# Patient Record
Sex: FEMALE | Race: WHITE
[De-identification: names, ages, dates, MRNs, and addresses within clinical notes are randomized per-mention and may not be internally consistent; named-entity substitution may affect disease eponyms.]

---

## 2018-10-01 ENCOUNTER — HOSPITAL ENCOUNTER (OUTPATIENT)
Dept: HOSPITAL 76 - LAB.R | Age: 28
End: 2018-10-01
Attending: ADVANCED PRACTICE MIDWIFE
Payer: COMMERCIAL

## 2018-10-01 DIAGNOSIS — Z36.9: Primary | ICD-10-CM

## 2018-10-01 PROCEDURE — 80306 DRUG TEST PRSMV INSTRMNT: CPT

## 2018-10-02 LAB
AMPHET UR QL SCN: NEGATIVE
BENZODIAZ UR QL SCN: NEGATIVE
COCAINE UR-SCNC: NEGATIVE UMOL/L
METHADONE UR QL SCN: NEGATIVE
METHAMPHET UR QL SCN: NEGATIVE
OPIATES UR QL SCN: NEGATIVE
VOLATILE DRUGS POS SERPL SCN: (no result)

## 2018-10-10 ENCOUNTER — HOSPITAL ENCOUNTER (OUTPATIENT)
Dept: HOSPITAL 76 - LAB | Age: 28
Discharge: HOME | End: 2018-10-10
Attending: ADVANCED PRACTICE MIDWIFE
Payer: COMMERCIAL

## 2018-10-10 DIAGNOSIS — Z36.9: Primary | ICD-10-CM

## 2018-10-10 LAB
ERYTHROCYTE [DISTWIDTH] IN BLOOD BY AUTOMATED COUNT: 13.1 % (ref 12–15)
HGB UR QL STRIP: 12 G/DL (ref 12–16)
MCH RBC QN AUTO: 30.2 PG (ref 27–31)
MCHC RBC AUTO-ENTMCNC: 34.7 G/DL (ref 32–36)
MCV RBC AUTO: 87 FL (ref 81–99)
NEUTROPHILS # SNV AUTO: 12.4 X10^3/UL (ref 4.8–10.8)
PDW BLD AUTO: 8.2 FL (ref 7.9–10.8)
PLATELET # BLD: 214 10^3/UL (ref 130–450)
RBC MAR: 3.99 10^6/UL (ref 4.2–5.4)

## 2018-10-10 PROCEDURE — 82950 GLUCOSE TEST: CPT

## 2018-10-10 PROCEDURE — 86850 RBC ANTIBODY SCREEN: CPT

## 2018-10-10 PROCEDURE — 36415 COLL VENOUS BLD VENIPUNCTURE: CPT

## 2018-10-10 PROCEDURE — 85027 COMPLETE CBC AUTOMATED: CPT

## 2018-10-18 ENCOUNTER — HOSPITAL ENCOUNTER (OUTPATIENT)
Dept: HOSPITAL 76 - LAB | Age: 28
Discharge: HOME | End: 2018-10-18
Attending: ADVANCED PRACTICE MIDWIFE
Payer: COMMERCIAL

## 2018-10-18 DIAGNOSIS — L29.9: ICD-10-CM

## 2018-10-18 DIAGNOSIS — R73.02: Primary | ICD-10-CM

## 2018-10-18 LAB
ALBUMIN DIAFP-MCNC: 3.3 G/DL (ref 3.2–5.5)
ALP SERPL-CCNC: 76 IU/L (ref 42–121)
ALT SERPL W P-5'-P-CCNC: 18 IU/L (ref 10–60)
AST SERPL W P-5'-P-CCNC: 22 IU/L (ref 10–42)
BILIRUB BLD-MCNC: 0.6 MG/DL (ref 0.2–1)
BILIRUB DIRECT SERPL-MCNC: < 0.1 MG/DL (ref 0.1–0.5)
GLOBULIN SER-MCNC: 3.1 G/DL (ref 2.1–4.2)
PROT SPEC-MCNC: 6.4 G/DL (ref 6.7–8.2)

## 2018-10-18 PROCEDURE — 80076 HEPATIC FUNCTION PANEL: CPT

## 2018-10-18 PROCEDURE — 82952 GTT-ADDED SAMPLES: CPT

## 2018-10-18 PROCEDURE — 82951 GLUCOSE TOLERANCE TEST (GTT): CPT

## 2018-10-18 PROCEDURE — 82239 BILE ACIDS TOTAL: CPT

## 2018-10-18 PROCEDURE — 36415 COLL VENOUS BLD VENIPUNCTURE: CPT

## 2018-10-22 ENCOUNTER — HOSPITAL ENCOUNTER (OUTPATIENT)
Dept: HOSPITAL 76 - WFO | Age: 28
Discharge: HOME | End: 2018-10-22
Attending: ADVANCED PRACTICE MIDWIFE
Payer: COMMERCIAL

## 2018-10-22 VITALS — DIASTOLIC BLOOD PRESSURE: 73 MMHG | SYSTOLIC BLOOD PRESSURE: 114 MMHG

## 2018-10-22 DIAGNOSIS — K83.1: ICD-10-CM

## 2018-10-22 DIAGNOSIS — O26.613: Primary | ICD-10-CM

## 2018-10-22 DIAGNOSIS — Z3A.29: ICD-10-CM

## 2018-10-22 PROCEDURE — 59025 FETAL NON-STRESS TEST: CPT

## 2018-10-24 ENCOUNTER — HOSPITAL ENCOUNTER (OUTPATIENT)
Dept: HOSPITAL 76 - WFO | Age: 28
Discharge: HOME | End: 2018-10-24
Attending: ADVANCED PRACTICE MIDWIFE
Payer: COMMERCIAL

## 2018-10-24 VITALS — DIASTOLIC BLOOD PRESSURE: 71 MMHG | SYSTOLIC BLOOD PRESSURE: 118 MMHG

## 2018-10-24 DIAGNOSIS — Z3A.29: ICD-10-CM

## 2018-10-24 DIAGNOSIS — O47.03: Primary | ICD-10-CM

## 2018-10-24 LAB
CLARITY UR REFRACT.AUTO: (no result)
EST. AVERAGE GLUCOSE BLD GHB EST-MCNC: 85 MG/DL (ref 70–100)
GLUCOSE UR QL STRIP.AUTO: NEGATIVE MG/DL
HB2 TOTAL: 12.9 G/DL
HBA1C BLD-MCNC: 0.35 G/DL
HEMOGLOBIN A1C %: 4.6 % (ref 4.6–6.2)
KETONES UR QL STRIP.AUTO: NEGATIVE MG/DL
NITRITE UR QL STRIP.AUTO: NEGATIVE
PH UR STRIP.AUTO: 6.5 PH (ref 5–7.5)
PROT UR STRIP.AUTO-MCNC: NEGATIVE MG/DL
RBC # UR STRIP.AUTO: NEGATIVE /UL
SP GR UR STRIP.AUTO: 1.02 (ref 1–1.03)
SQUAMOUS URNS QL MICRO: (no result)
UROBILINOGEN UR QL STRIP.AUTO: (no result) E.U./DL
UROBILINOGEN UR STRIP.AUTO-MCNC: NEGATIVE MG/DL

## 2018-10-24 PROCEDURE — 36415 COLL VENOUS BLD VENIPUNCTURE: CPT

## 2018-10-24 PROCEDURE — 83036 HEMOGLOBIN GLYCOSYLATED A1C: CPT

## 2018-10-24 PROCEDURE — 99212 OFFICE O/P EST SF 10 MIN: CPT

## 2018-10-24 PROCEDURE — 87086 URINE CULTURE/COLONY COUNT: CPT

## 2018-10-24 PROCEDURE — 81003 URINALYSIS AUTO W/O SCOPE: CPT

## 2018-10-24 PROCEDURE — 81001 URINALYSIS AUTO W/SCOPE: CPT

## 2018-10-24 PROCEDURE — 82731 ASSAY OF FETAL FIBRONECTIN: CPT

## 2018-10-29 ENCOUNTER — HOSPITAL ENCOUNTER (OUTPATIENT)
Dept: HOSPITAL 76 - WFO | Age: 28
Discharge: HOME | End: 2018-10-29
Attending: OBSTETRICS & GYNECOLOGY
Payer: COMMERCIAL

## 2018-10-29 ENCOUNTER — HOSPITAL ENCOUNTER (OUTPATIENT)
Dept: HOSPITAL 76 - LAB.R | Age: 28
Discharge: HOME | End: 2018-10-29
Attending: OBSTETRICS & GYNECOLOGY
Payer: COMMERCIAL

## 2018-10-29 VITALS — DIASTOLIC BLOOD PRESSURE: 65 MMHG | SYSTOLIC BLOOD PRESSURE: 108 MMHG

## 2018-10-29 DIAGNOSIS — O36.5930: ICD-10-CM

## 2018-10-29 DIAGNOSIS — Z3A.30: ICD-10-CM

## 2018-10-29 DIAGNOSIS — O26.619: Primary | ICD-10-CM

## 2018-10-29 DIAGNOSIS — K83.1: ICD-10-CM

## 2018-10-29 DIAGNOSIS — O26.613: Primary | ICD-10-CM

## 2018-10-29 LAB
ALBUMIN DIAFP-MCNC: 3.1 G/DL (ref 3.2–5.5)
ALP SERPL-CCNC: 89 IU/L (ref 42–121)
ALT SERPL W P-5'-P-CCNC: 16 IU/L (ref 10–60)
AST SERPL W P-5'-P-CCNC: 20 IU/L (ref 10–42)
BILIRUB BLD-MCNC: 0.5 MG/DL (ref 0.2–1)
BILIRUB DIRECT SERPL-MCNC: < 0.1 MG/DL (ref 0.1–0.5)
GLOBULIN SER-MCNC: 3.2 G/DL (ref 2.1–4.2)
PROT SPEC-MCNC: 6.3 G/DL (ref 6.7–8.2)

## 2018-10-29 PROCEDURE — 86592 SYPHILIS TEST NON-TREP QUAL: CPT

## 2018-10-29 PROCEDURE — 87389 HIV-1 AG W/HIV-1&-2 AB AG IA: CPT

## 2018-10-29 PROCEDURE — 80076 HEPATIC FUNCTION PANEL: CPT

## 2018-10-29 PROCEDURE — 87340 HEPATITIS B SURFACE AG IA: CPT

## 2018-10-29 PROCEDURE — 59025 FETAL NON-STRESS TEST: CPT

## 2018-10-29 PROCEDURE — 82239 BILE ACIDS TOTAL: CPT

## 2018-10-29 PROCEDURE — 81599 UNLISTED MAAA: CPT

## 2018-10-29 PROCEDURE — 86803 HEPATITIS C AB TEST: CPT

## 2018-10-29 PROCEDURE — 76816 OB US FOLLOW-UP PER FETUS: CPT

## 2018-10-29 NOTE — ULTRASOUND REPORT
Reason:  Growth discrepancy

Procedure Date:  10/29/2018   

Accession Number:  [id number] / [id number]                    

Procedure:  US  - OB F/U or Repeat CPT Code:  

 

FULL RESULT:

 

 

EXAM:

FOLLOW-UP OBSTETRICAL ULTRASOUND

 

EXAM DATE: 10/29/2018 05:15 PM.

 

CLINICAL HISTORY: Growth discrepancy.

 

COMPARISON: None.

 

TECHNIQUE: Real-time sonographic evaluation of the fetus performed by the 

sonographer.  Multiple representative static images were saved for review.

 

DATING:

Established EGA 30 weeks 0 days with FRANCA 01/19/2019 based on source of 

assigned dating.

EGA 29 weeks 2 days based on the current ultrasound.

 

GENERAL EVALUATION

Hutchison pregnancy.

Cardiac activity: 135 bpm.

Fetal movement: Visualized.

Presentation: Cephalic.

Placenta: Posterior position.

Amniotic fluid: Normal. NOÉ 11.4 cm

 

FETAL BIOMETRY

Bi-Parietal Diameter (BPD): 7.6 cm, 30 weeks 3 days

Head Circumference (HC): 28.3 cm, 31 weeks 0 days

Abdominal Circumference (AC): 24.8 cm, 29 weeks 0 days

Femur Length (FL): 5.7 cm, 30 weeks 0 days

 

Estimated Fetal Weight: 1427 g, 26th percentile for 30 weeks 0 days.

IMPRESSION:

1. Hutchison live intrauterine pregnancy with gestational age 30 weeks 0 

days based on source of assigned dating.

2. Estimated fetal weight is within expected limits for assigned dating.

 

JOHN

## 2018-10-30 LAB
HEPATITIS B SURFACE ANTIGEN: (no result)
HEPATITIS C ANTIBODY: (no result)
HIV AG/AB 4TH GEN: (no result)
SIGNAL TO CUT-OFF: 0 (ref ?–1)

## 2018-11-02 ENCOUNTER — HOSPITAL ENCOUNTER (OUTPATIENT)
Dept: HOSPITAL 76 - WFO | Age: 28
Discharge: HOME | End: 2018-11-02
Attending: ADVANCED PRACTICE MIDWIFE
Payer: COMMERCIAL

## 2018-11-02 VITALS — DIASTOLIC BLOOD PRESSURE: 67 MMHG | SYSTOLIC BLOOD PRESSURE: 111 MMHG

## 2018-11-02 DIAGNOSIS — K83.1: ICD-10-CM

## 2018-11-02 DIAGNOSIS — Z3A.31: ICD-10-CM

## 2018-11-02 DIAGNOSIS — O26.613: Primary | ICD-10-CM

## 2018-11-02 DIAGNOSIS — O24.419: ICD-10-CM

## 2018-11-02 PROCEDURE — 59025 FETAL NON-STRESS TEST: CPT

## 2018-11-05 ENCOUNTER — HOSPITAL ENCOUNTER (OUTPATIENT)
Dept: HOSPITAL 76 - WFO | Age: 28
Discharge: HOME | End: 2018-11-05
Attending: ADVANCED PRACTICE MIDWIFE
Payer: COMMERCIAL

## 2018-11-05 VITALS — DIASTOLIC BLOOD PRESSURE: 70 MMHG | SYSTOLIC BLOOD PRESSURE: 110 MMHG

## 2018-11-05 DIAGNOSIS — K83.1: ICD-10-CM

## 2018-11-05 DIAGNOSIS — O26.613: Primary | ICD-10-CM

## 2018-11-05 DIAGNOSIS — Z3A.31: ICD-10-CM

## 2018-11-05 LAB
ALBUMIN DIAFP-MCNC: 3.2 G/DL (ref 3.2–5.5)
ALP SERPL-CCNC: 88 IU/L (ref 42–121)
ALT SERPL W P-5'-P-CCNC: 13 IU/L (ref 10–60)
AST SERPL W P-5'-P-CCNC: 21 IU/L (ref 10–42)
BASOPHILS NFR BLD AUTO: 0 10^3/UL (ref 0–0.1)
BASOPHILS NFR BLD AUTO: 0.2 %
BILIRUB BLD-MCNC: 0.5 MG/DL (ref 0.2–1)
BILIRUB DIRECT SERPL-MCNC: 0.1 MG/DL (ref 0.1–0.5)
EOSINOPHIL # BLD AUTO: 0.3 10^3/UL (ref 0–0.7)
EOSINOPHIL NFR BLD AUTO: 2.3 %
ERYTHROCYTE [DISTWIDTH] IN BLOOD BY AUTOMATED COUNT: 12.8 % (ref 12–15)
GLOBULIN SER-MCNC: 3.1 G/DL (ref 2.1–4.2)
HGB UR QL STRIP: 12.2 G/DL (ref 12–16)
LYMPHOCYTES # SPEC AUTO: 1.8 10^3/UL (ref 1.5–3.5)
LYMPHOCYTES NFR BLD AUTO: 14.4 %
MCH RBC QN AUTO: 30 PG (ref 27–31)
MCHC RBC AUTO-ENTMCNC: 34.9 G/DL (ref 32–36)
MCV RBC AUTO: 85.9 FL (ref 81–99)
MONOCYTES # BLD AUTO: 0.7 10^3/UL (ref 0–1)
MONOCYTES NFR BLD AUTO: 5.8 %
NEUTROPHILS # BLD AUTO: 9.5 10^3/UL (ref 1.5–6.6)
NEUTROPHILS # SNV AUTO: 12.3 X10^3/UL (ref 4.8–10.8)
NEUTROPHILS NFR BLD AUTO: 77.3 %
PDW BLD AUTO: 8.5 FL (ref 7.9–10.8)
PLATELET # BLD: 202 10^3/UL (ref 130–450)
PROT SPEC-MCNC: 6.3 G/DL (ref 6.7–8.2)
RBC MAR: 4.06 10^6/UL (ref 4.2–5.4)

## 2018-11-05 PROCEDURE — 82542 COL CHROMOTOGRAPHY QUAL/QUAN: CPT

## 2018-11-05 PROCEDURE — 36415 COLL VENOUS BLD VENIPUNCTURE: CPT

## 2018-11-05 PROCEDURE — 80076 HEPATIC FUNCTION PANEL: CPT

## 2018-11-05 PROCEDURE — 59025 FETAL NON-STRESS TEST: CPT

## 2018-11-05 PROCEDURE — 85025 COMPLETE CBC W/AUTO DIFF WBC: CPT

## 2018-11-05 PROCEDURE — 96372 THER/PROPH/DIAG INJ SC/IM: CPT

## 2018-11-06 ENCOUNTER — HOSPITAL ENCOUNTER (OUTPATIENT)
Dept: HOSPITAL 76 - WFO | Age: 28
Discharge: HOME | End: 2018-11-06
Attending: ADVANCED PRACTICE MIDWIFE
Payer: COMMERCIAL

## 2018-11-06 VITALS — SYSTOLIC BLOOD PRESSURE: 102 MMHG | DIASTOLIC BLOOD PRESSURE: 66 MMHG

## 2018-11-06 DIAGNOSIS — Z3A.31: ICD-10-CM

## 2018-11-06 DIAGNOSIS — K83.1: ICD-10-CM

## 2018-11-06 DIAGNOSIS — O26.613: Primary | ICD-10-CM

## 2018-11-06 PROCEDURE — 96372 THER/PROPH/DIAG INJ SC/IM: CPT

## 2018-11-07 ENCOUNTER — HOSPITAL ENCOUNTER (OUTPATIENT)
Dept: HOSPITAL 76 - WFO | Age: 28
Discharge: HOME | End: 2018-11-07
Attending: OBSTETRICS & GYNECOLOGY
Payer: COMMERCIAL

## 2018-11-07 VITALS — DIASTOLIC BLOOD PRESSURE: 67 MMHG | SYSTOLIC BLOOD PRESSURE: 114 MMHG

## 2018-11-07 DIAGNOSIS — Z3A.31: ICD-10-CM

## 2018-11-07 DIAGNOSIS — O24.419: ICD-10-CM

## 2018-11-07 DIAGNOSIS — O26.613: ICD-10-CM

## 2018-11-07 DIAGNOSIS — O36.8130: Primary | ICD-10-CM

## 2018-11-07 DIAGNOSIS — K83.1: ICD-10-CM

## 2018-11-07 PROCEDURE — 59025 FETAL NON-STRESS TEST: CPT

## 2018-11-09 ENCOUNTER — HOSPITAL ENCOUNTER (OUTPATIENT)
Dept: HOSPITAL 76 - WFO | Age: 28
Discharge: HOME | End: 2018-11-09
Attending: OBSTETRICS & GYNECOLOGY
Payer: COMMERCIAL

## 2018-11-09 VITALS — SYSTOLIC BLOOD PRESSURE: 103 MMHG | DIASTOLIC BLOOD PRESSURE: 69 MMHG

## 2018-11-09 DIAGNOSIS — O26.613: Primary | ICD-10-CM

## 2018-11-09 DIAGNOSIS — Z3A.31: ICD-10-CM

## 2018-11-09 DIAGNOSIS — K83.1: ICD-10-CM

## 2018-11-09 PROCEDURE — 59025 FETAL NON-STRESS TEST: CPT

## 2018-11-10 LAB
BILE AC SERPL-SCNC: 3.4 UMOL/L
CDCAE SERPL-SCNC: 1.8 UMOL/L
CHOLATE SERPL-SCNC: 0.6 UMOL/L
DO-CHOLATE SERPL-SCNC: 1.1 UMOL/L

## 2018-11-12 ENCOUNTER — HOSPITAL ENCOUNTER (OUTPATIENT)
Dept: HOSPITAL 76 - WFO | Age: 28
Discharge: HOME | End: 2018-11-12
Attending: ADVANCED PRACTICE MIDWIFE
Payer: COMMERCIAL

## 2018-11-12 VITALS — DIASTOLIC BLOOD PRESSURE: 65 MMHG | SYSTOLIC BLOOD PRESSURE: 113 MMHG

## 2018-11-12 DIAGNOSIS — Z3A.32: ICD-10-CM

## 2018-11-12 DIAGNOSIS — O26.613: Primary | ICD-10-CM

## 2018-11-12 DIAGNOSIS — K83.1: ICD-10-CM

## 2018-11-12 PROCEDURE — 59025 FETAL NON-STRESS TEST: CPT

## 2018-11-15 ENCOUNTER — HOSPITAL ENCOUNTER (OUTPATIENT)
Dept: HOSPITAL 76 - WFO | Age: 28
Discharge: HOME | End: 2018-11-15
Attending: ADVANCED PRACTICE MIDWIFE
Payer: COMMERCIAL

## 2018-11-15 VITALS — DIASTOLIC BLOOD PRESSURE: 73 MMHG | SYSTOLIC BLOOD PRESSURE: 121 MMHG

## 2018-11-15 DIAGNOSIS — K83.1: ICD-10-CM

## 2018-11-15 DIAGNOSIS — Z3A.32: ICD-10-CM

## 2018-11-15 DIAGNOSIS — O26.613: Primary | ICD-10-CM

## 2018-11-15 PROCEDURE — 59025 FETAL NON-STRESS TEST: CPT

## 2018-11-19 ENCOUNTER — HOSPITAL ENCOUNTER (OUTPATIENT)
Dept: HOSPITAL 76 - WFO | Age: 28
Discharge: HOME | End: 2018-11-19
Attending: OBSTETRICS & GYNECOLOGY
Payer: COMMERCIAL

## 2018-11-19 VITALS — DIASTOLIC BLOOD PRESSURE: 73 MMHG | SYSTOLIC BLOOD PRESSURE: 112 MMHG

## 2018-11-19 DIAGNOSIS — K83.1: ICD-10-CM

## 2018-11-19 DIAGNOSIS — O24.410: ICD-10-CM

## 2018-11-19 DIAGNOSIS — Z79.899: ICD-10-CM

## 2018-11-19 DIAGNOSIS — Z3A.33: ICD-10-CM

## 2018-11-19 DIAGNOSIS — O26.613: Primary | ICD-10-CM

## 2018-11-19 LAB
ALBUMIN DIAFP-MCNC: 3.1 G/DL (ref 3.2–5.5)
ALP SERPL-CCNC: 108 IU/L (ref 42–121)
ALT SERPL W P-5'-P-CCNC: 14 IU/L (ref 10–60)
AST SERPL W P-5'-P-CCNC: 21 IU/L (ref 10–42)
BILIRUB BLD-MCNC: 0.3 MG/DL (ref 0.2–1)
BILIRUB DIRECT SERPL-MCNC: < 0.1 MG/DL (ref 0.1–0.5)
CREAT SERPLBLD-SCNC: 0.4 MG/DL (ref 0.4–1)
GFRSERPLBLD MDRD-ARVRAT: 191 ML/MIN/{1.73_M2} (ref 89–?)
GLOBULIN SER-MCNC: 3.1 G/DL (ref 2.1–4.2)
PROT SPEC-MCNC: 6.2 G/DL (ref 6.7–8.2)

## 2018-11-19 PROCEDURE — 59025 FETAL NON-STRESS TEST: CPT

## 2018-11-19 PROCEDURE — 36415 COLL VENOUS BLD VENIPUNCTURE: CPT

## 2018-11-19 PROCEDURE — 82565 ASSAY OF CREATININE: CPT

## 2018-11-19 PROCEDURE — 82542 COL CHROMOTOGRAPHY QUAL/QUAN: CPT

## 2018-11-19 PROCEDURE — 80076 HEPATIC FUNCTION PANEL: CPT

## 2018-11-19 NOTE — PROVIDER PROGRESS NOTE
Subjective





- Prog Note Date


Prog Note Date: 11/19/18


Prog Note Time: 17:30





- Subjective


Subjective: 


Diagnosis cholestasis of pregnancy, class a diabetes in pregnancy, 33 weeks 3 

days gestation 





Farideh Reilly is a 28-year-old  primigravida at 33 weeks 3 days gestation

with cholestasis of pregnancy and is been followed by Dr. Bowers and Paulo roe, certified nurse midwife.  Today she comes for scheduled surveillance NST.

 She reports good fetal movement without signs or symptoms her pruritus 

controlled with ursodiol.   Current dosage is 600 mg every morning, 300 mg 

midday, and 600 mg in the evening.  She reports good fetal movement and no 

abdominal pain.





Patient had a MFM consultation on 13 November that reported normal fetal growth.

 MFM consultation report pending.  Her uric acid bile acid levels have 

fluctuated significantly.  Baseline level was 27, on 5 November 45, 12 November 

3.4, 13 November 29.  A bile acid salts level and liver enzymes were drawn 

today.  Fetal well-being is being monitored by twice weekly ultrasounds, weekly 

NOÉ's and every 2 to 3-week EFW's./Growth checks.  Induction is planned for 

either 8 December or 15 December depending on bile acid levels, and fetal 

survival surveillance results.  Patient has already received a course of 

betamethasone.





Additionally patient carries diagnosis of gestational diabetes, diet-controlled 

(class a 1).  She adheres to her diet strictly and reports her fasting and 

postprandial Accu-Cheks under 100.











Objective





- Vital Signs/Intake & Output


Vital Signs: 


                                Vital Signs x48h











  Temp Pulse Resp BP Pulse Ox


 


 11/19/18 16:45  97.9 F  73  16  112/73  100














- Lab Results


Fish Bones: 


                                 11/19/18 17:42


Other Labs: 


                               Lab Results x24hrs











  11/19/18 Range/Units





  17:42 


 


Creatinine  0.4  (0.4-1.0)  mg/dL


 


Estimated GFR (MDRD)  191  (>89)  


 


Total Bilirubin  0.3  (0.2-1.0)  mg/dL


 


Direct Bilirubin  < 0.1 L  (0.1-0.5)  mg/dL


 


AST  21  (10-42)  IU/L


 


ALT  14  (10-60)  IU/L


 


Alkaline Phosphatase  108  ()  IU/L


 


Total Protein  6.2 L  (6.7-8.2)  g/dL


 


Albumin  3.1 L  (3.2-5.5)  g/dL


 


Globulin  3.1  (2.1-4.2)  g/dL














Physical Exam





- Physical Exam


General: positive: No acute distress, Well developed/nourished, Alert


HEENT: positive: Moist mucous membranes, Dentition normal


Neck: positive: Supple w/out meningeal sx, Thyroid normal


Abdomen: positive: Normal Bowel sounds, Other (No organomegaly or liver 

tenderness)


Female : positive: Enlarged uterus (Uterus seems appropriate size and 

transverse head down presentation by Leopold's.  No uterine tenderness or 

contractions detected by palpation), Other (NST reactive, category 1; baseline 

130 moderate variability no decelerations positive accelerations)


Extremities: positive: No pedal edema


Skin: positive: Warm and dry, Other (No evidence of icterus)


Neurologic: positive: Alert and Oriented X 3, Normal Sensation, Normal Speech





Assessment/Plan





- Assessment/Plan


Assessment: 


'Patient has known cholestasis of pregnancy and gestational diabetes.  Over the 

course of pregnancy bile acid salts seem labile and on occasions over 10 thereby

placing pregnancy at risk.  Appropriate consultation with maternal-fetal 

medicine has been obtained.  Surveillance of fetal well-being in place.  

Clinically patient is responding to your ursodiol.  Await tpdays bile acid level

and liver enzymes.  Patient's gestational diabetes is controlled.





Plan: 


1.  Continue close fetal surveillance.  With biweekly NST and weekly NOÉ's.  

EFW's being monitored.





2.  Await today's bile acid salt levels and LFTs.





3.  Induction of labor is planned for either 8 December or 15 December depending

on bile acid salt level and state of fetal well-being.

## 2018-11-20 ENCOUNTER — HOSPITAL ENCOUNTER (OUTPATIENT)
Dept: HOSPITAL 76 - WFO | Age: 28
Discharge: HOME | End: 2018-11-20
Attending: OBSTETRICS & GYNECOLOGY
Payer: COMMERCIAL

## 2018-11-20 VITALS — SYSTOLIC BLOOD PRESSURE: 119 MMHG | DIASTOLIC BLOOD PRESSURE: 73 MMHG

## 2018-11-20 DIAGNOSIS — M54.9: ICD-10-CM

## 2018-11-20 DIAGNOSIS — Z3A.33: ICD-10-CM

## 2018-11-20 DIAGNOSIS — O99.89: Primary | ICD-10-CM

## 2018-11-20 LAB
CLARITY UR REFRACT.AUTO: CLEAR
GLUCOSE UR QL STRIP.AUTO: NEGATIVE MG/DL
KETONES UR QL STRIP.AUTO: NEGATIVE MG/DL
NITRITE UR QL STRIP.AUTO: NEGATIVE
PH UR STRIP.AUTO: 6.5 PH (ref 5–7.5)
PROT UR STRIP.AUTO-MCNC: NEGATIVE MG/DL
RBC # UR STRIP.AUTO: NEGATIVE /UL
SP GR UR STRIP.AUTO: <=1.005 (ref 1–1.03)
UROBILINOGEN UR QL STRIP.AUTO: (no result) E.U./DL
UROBILINOGEN UR STRIP.AUTO-MCNC: NEGATIVE MG/DL

## 2018-11-20 PROCEDURE — 99213 OFFICE O/P EST LOW 20 MIN: CPT

## 2018-11-20 PROCEDURE — 81003 URINALYSIS AUTO W/O SCOPE: CPT

## 2018-11-20 PROCEDURE — 81001 URINALYSIS AUTO W/SCOPE: CPT

## 2018-11-20 PROCEDURE — 87086 URINE CULTURE/COLONY COUNT: CPT

## 2018-11-22 ENCOUNTER — HOSPITAL ENCOUNTER (OUTPATIENT)
Dept: HOSPITAL 76 - WFO | Age: 28
Discharge: HOME | End: 2018-11-22
Attending: OBSTETRICS & GYNECOLOGY
Payer: COMMERCIAL

## 2018-11-22 VITALS — SYSTOLIC BLOOD PRESSURE: 112 MMHG | DIASTOLIC BLOOD PRESSURE: 66 MMHG

## 2018-11-22 DIAGNOSIS — O26.613: Primary | ICD-10-CM

## 2018-11-22 DIAGNOSIS — K83.1: ICD-10-CM

## 2018-11-22 DIAGNOSIS — Z3A.33: ICD-10-CM

## 2018-11-22 PROCEDURE — 59025 FETAL NON-STRESS TEST: CPT

## 2018-11-23 ENCOUNTER — HOSPITAL ENCOUNTER (OUTPATIENT)
Dept: HOSPITAL 76 - LAB.R | Age: 28
Discharge: HOME | End: 2018-11-23
Attending: OBSTETRICS & GYNECOLOGY
Payer: COMMERCIAL

## 2018-11-23 DIAGNOSIS — O09.90: Primary | ICD-10-CM

## 2018-11-23 PROCEDURE — 82731 ASSAY OF FETAL FIBRONECTIN: CPT

## 2018-11-25 LAB
BILE AC SERPL-SCNC: 1.9 UMOL/L
CDCAE SERPL-SCNC: 1.1 UMOL/L
CHOLATE SERPL-SCNC: <0.5 UMOL/L
DO-CHOLATE SERPL-SCNC: 0.8 UMOL/L

## 2018-11-26 ENCOUNTER — HOSPITAL ENCOUNTER (OUTPATIENT)
Dept: HOSPITAL 76 - LAB.R | Age: 28
Discharge: HOME | End: 2018-11-26
Attending: OBSTETRICS & GYNECOLOGY
Payer: COMMERCIAL

## 2018-11-26 ENCOUNTER — HOSPITAL ENCOUNTER (OUTPATIENT)
Dept: HOSPITAL 76 - WFO | Age: 28
Discharge: HOME | End: 2018-11-26
Attending: OBSTETRICS & GYNECOLOGY
Payer: COMMERCIAL

## 2018-11-26 VITALS — SYSTOLIC BLOOD PRESSURE: 110 MMHG | DIASTOLIC BLOOD PRESSURE: 71 MMHG

## 2018-11-26 DIAGNOSIS — Z3A.34: ICD-10-CM

## 2018-11-26 DIAGNOSIS — O09.90: Primary | ICD-10-CM

## 2018-11-26 DIAGNOSIS — K83.1: ICD-10-CM

## 2018-11-26 DIAGNOSIS — O26.613: Primary | ICD-10-CM

## 2018-11-26 LAB
ALT SERPL W P-5'-P-CCNC: 14 IU/L (ref 10–60)
AST SERPL W P-5'-P-CCNC: 21 IU/L (ref 10–42)

## 2018-11-26 PROCEDURE — 84460 ALANINE AMINO (ALT) (SGPT): CPT

## 2018-11-26 PROCEDURE — 84450 TRANSFERASE (AST) (SGOT): CPT

## 2018-11-26 PROCEDURE — 59025 FETAL NON-STRESS TEST: CPT

## 2018-11-26 PROCEDURE — 82239 BILE ACIDS TOTAL: CPT

## 2018-11-28 NOTE — PROVIDER PROGRESS NOTE
Subjective





- Prog Note Date


Prog Note Date: 11/22/18


Prog Note Time: 17:00





- Subjective


Pt reports feeling: No change


Subjective: 





Scheduled NST for Cholestasis


NST Reactive, Cat 1,140 baseline No Contrax


DC Home w Instructions

## 2018-11-29 ENCOUNTER — HOSPITAL ENCOUNTER (OUTPATIENT)
Dept: HOSPITAL 76 - WFO | Age: 28
Discharge: HOME | End: 2018-11-29
Attending: OBSTETRICS & GYNECOLOGY
Payer: COMMERCIAL

## 2018-11-29 VITALS — SYSTOLIC BLOOD PRESSURE: 112 MMHG | DIASTOLIC BLOOD PRESSURE: 72 MMHG

## 2018-11-29 DIAGNOSIS — Z3A.34: ICD-10-CM

## 2018-11-29 DIAGNOSIS — K83.1: ICD-10-CM

## 2018-11-29 DIAGNOSIS — O26.613: Primary | ICD-10-CM

## 2018-11-29 PROCEDURE — 59025 FETAL NON-STRESS TEST: CPT

## 2018-11-30 ENCOUNTER — HOSPITAL ENCOUNTER (OUTPATIENT)
Dept: HOSPITAL 76 - LAB | Age: 28
Discharge: HOME | End: 2018-11-30
Attending: OBSTETRICS & GYNECOLOGY
Payer: COMMERCIAL

## 2018-11-30 ENCOUNTER — HOSPITAL ENCOUNTER (OUTPATIENT)
Dept: HOSPITAL 76 - LAB.R | Age: 28
Discharge: HOME | End: 2018-11-30
Attending: OBSTETRICS & GYNECOLOGY
Payer: COMMERCIAL

## 2018-11-30 DIAGNOSIS — O09.90: Primary | ICD-10-CM

## 2018-11-30 DIAGNOSIS — O09.90: ICD-10-CM

## 2018-11-30 DIAGNOSIS — O26.613: Primary | ICD-10-CM

## 2018-11-30 PROCEDURE — 87081 CULTURE SCREEN ONLY: CPT

## 2018-11-30 PROCEDURE — 36415 COLL VENOUS BLD VENIPUNCTURE: CPT

## 2018-11-30 PROCEDURE — 82239 BILE ACIDS TOTAL: CPT

## 2018-12-03 ENCOUNTER — HOSPITAL ENCOUNTER (OUTPATIENT)
Dept: HOSPITAL 76 - WFO | Age: 28
Discharge: HOME | End: 2018-12-03
Attending: OBSTETRICS & GYNECOLOGY
Payer: COMMERCIAL

## 2018-12-03 VITALS — DIASTOLIC BLOOD PRESSURE: 68 MMHG | SYSTOLIC BLOOD PRESSURE: 110 MMHG

## 2018-12-03 DIAGNOSIS — K83.1: ICD-10-CM

## 2018-12-03 DIAGNOSIS — Z3A.35: ICD-10-CM

## 2018-12-03 DIAGNOSIS — O26.613: Primary | ICD-10-CM

## 2018-12-03 PROCEDURE — 59025 FETAL NON-STRESS TEST: CPT

## 2018-12-04 ENCOUNTER — HOSPITAL ENCOUNTER (OUTPATIENT)
Dept: HOSPITAL 76 - DI | Age: 28
Discharge: HOME | End: 2018-12-04
Attending: OBSTETRICS & GYNECOLOGY
Payer: COMMERCIAL

## 2018-12-04 DIAGNOSIS — Z3A.35: ICD-10-CM

## 2018-12-04 DIAGNOSIS — O09.93: ICD-10-CM

## 2018-12-04 DIAGNOSIS — O26.613: Primary | ICD-10-CM

## 2018-12-04 PROCEDURE — 76815 OB US LIMITED FETUS(S): CPT

## 2018-12-05 NOTE — ULTRASOUND REPORT
Reason:  SUPERVISION HIGH RISK PREGNANCY,UNSPECIFIED TRIMES

Procedure Date:  12/04/2018   

Accession Number:  [id number] / [id number]                    

Procedure:  US  - OB Limited CPT Code:  

 

FULL RESULT:

 

 

EXAM:

LIMITED OBSTETRICAL ULTRASOUND

 

EXAM DATE: 12/4/2018 07:42 PM.

 

CLINICAL HISTORY: Supervision of high risk pregnancy, unspecified 

trimester.

 

COMPARISON: OB F/U OR REPEAT 10/29/2018 4:44 PM.

 

TECHNIQUE: Real-time sonographic evaluation of the fetus performed by the 

sonographer. Multiple representative static images were saved for review.

 

DATING:

Established EGA 35 weeks 1 day with FRANCA 01/07/2019 as provided by the 

referring physician.

 

GENERAL EVALUATION

Hutchison pregnancy.

Cardiac activity: 163 bpm.

Fetal movement: Visualized.

Presentation: Cephalic.

Placenta: Fundal position.

Amniotic fluid: 12.5 cm NOÉ, MVP 5.2 cm.

 

FETAL BIOMETRY

Bi-Parietal Diameter (BPD): 8.8 cm, 35 weeks 4 days

Head Circumference (HC): 31.2 cm, 34 weeks 6 days

Abdominal Circumference (AC): 30.7 cm, 34 weeks 4 days

Femur Length (FL): 6.9 cm, 35 weeks 2 days

 

Estimated Fetal Weight: 2546 g, 41st percentile for 35 weeks 1 day.

IMPRESSION:

1. Hutchison live intrauterine pregnancy with gestational age 35 weeks 1 

day based on established due date as provided by the referring physician.

2. Estimated fetal weight is within expected limits for assigned dating.

3. Normal interval growth compared to 10/29/2018.

 

RADIA

## 2018-12-06 ENCOUNTER — HOSPITAL ENCOUNTER (OUTPATIENT)
Dept: HOSPITAL 76 - WFO | Age: 28
Discharge: HOME | End: 2018-12-06
Attending: OBSTETRICS & GYNECOLOGY
Payer: COMMERCIAL

## 2018-12-06 VITALS — DIASTOLIC BLOOD PRESSURE: 78 MMHG | SYSTOLIC BLOOD PRESSURE: 113 MMHG

## 2018-12-06 DIAGNOSIS — Z3A.35: ICD-10-CM

## 2018-12-06 DIAGNOSIS — K83.1: ICD-10-CM

## 2018-12-06 DIAGNOSIS — O26.613: Primary | ICD-10-CM

## 2018-12-06 PROCEDURE — 59025 FETAL NON-STRESS TEST: CPT

## 2018-12-10 ENCOUNTER — HOSPITAL ENCOUNTER (OUTPATIENT)
Dept: HOSPITAL 76 - WFO | Age: 28
Discharge: HOME | End: 2018-12-10
Attending: OBSTETRICS & GYNECOLOGY
Payer: COMMERCIAL

## 2018-12-10 VITALS — DIASTOLIC BLOOD PRESSURE: 72 MMHG | SYSTOLIC BLOOD PRESSURE: 119 MMHG

## 2018-12-10 DIAGNOSIS — Z3A.36: ICD-10-CM

## 2018-12-10 DIAGNOSIS — O26.613: Primary | ICD-10-CM

## 2018-12-10 DIAGNOSIS — K83.1: ICD-10-CM

## 2018-12-10 PROCEDURE — 59025 FETAL NON-STRESS TEST: CPT

## 2018-12-10 NOTE — PROVIDER PROGRESS NOTE
Subjective





- Prog Note Date


Prog Note Date: 12/10/18


Prog Note Time: 17:30





- Subjective


Subjective: 


Farideh Reilly is a 28-year-old woman at 36 weeks and 3 days who comes for her 

scheduled biweekly NST.  She reports fetal movement.  No signs or symptoms of pr

eeclampsia.  Her pruritus is actually decreased somewhat from last week.





External fetal monitor strip: Baseline 120-130, moderate variability, multiple 

accelerations meeting criteria; occasional contractions noted every 7 or more 

minutes.





Reactive NST, category 1 strip





Induction of labor plan for Saturday with Dr. Alvarez








Objective





- Vital Signs/Intake & Output


Vital Signs: 


                                Vital Signs x48h











  Temp Pulse Resp BP Pulse Ox


 


 12/10/18 17:06  98.6 F  60  16  119/72  100

## 2018-12-13 ENCOUNTER — HOSPITAL ENCOUNTER (OUTPATIENT)
Dept: HOSPITAL 76 - WFO | Age: 28
Discharge: HOME | End: 2018-12-13
Attending: ADVANCED PRACTICE MIDWIFE
Payer: COMMERCIAL

## 2018-12-13 VITALS — DIASTOLIC BLOOD PRESSURE: 57 MMHG | SYSTOLIC BLOOD PRESSURE: 104 MMHG

## 2018-12-13 DIAGNOSIS — K83.1: ICD-10-CM

## 2018-12-13 DIAGNOSIS — O26.613: Primary | ICD-10-CM

## 2018-12-13 DIAGNOSIS — O24.419: ICD-10-CM

## 2018-12-13 DIAGNOSIS — Z3A.36: ICD-10-CM

## 2018-12-13 PROCEDURE — 59025 FETAL NON-STRESS TEST: CPT

## 2018-12-15 ENCOUNTER — HOSPITAL ENCOUNTER (INPATIENT)
Dept: HOSPITAL 76 - WFO | Age: 28
LOS: 3 days | Discharge: HOME | End: 2018-12-18
Attending: OBSTETRICS & GYNECOLOGY | Admitting: OBSTETRICS & GYNECOLOGY
Payer: COMMERCIAL

## 2018-12-15 DIAGNOSIS — K83.1: ICD-10-CM

## 2018-12-15 DIAGNOSIS — Z3A.37: ICD-10-CM

## 2018-12-15 LAB
BASOPHILS NFR BLD AUTO: 0.1 10^3/UL (ref 0–0.1)
BASOPHILS NFR BLD AUTO: 0.4 %
EOSINOPHIL # BLD AUTO: 0.3 10^3/UL (ref 0–0.7)
EOSINOPHIL NFR BLD AUTO: 2.1 %
ERYTHROCYTE [DISTWIDTH] IN BLOOD BY AUTOMATED COUNT: 13 % (ref 12–15)
HGB UR QL STRIP: 13 G/DL (ref 12–16)
LYMPHOCYTES # SPEC AUTO: 1.9 10^3/UL (ref 1.5–3.5)
LYMPHOCYTES NFR BLD AUTO: 14.5 %
MCH RBC QN AUTO: 30.2 PG (ref 27–31)
MCHC RBC AUTO-ENTMCNC: 34.8 G/DL (ref 32–36)
MCV RBC AUTO: 86.8 FL (ref 81–99)
MONOCYTES # BLD AUTO: 0.9 10^3/UL (ref 0–1)
MONOCYTES NFR BLD AUTO: 6.6 %
NEUTROPHILS # BLD AUTO: 9.9 10^3/UL (ref 1.5–6.6)
NEUTROPHILS # SNV AUTO: 13 X10^3/UL (ref 4.8–10.8)
NEUTROPHILS NFR BLD AUTO: 76.4 %
PDW BLD AUTO: 8.5 FL (ref 7.9–10.8)
PLATELET # BLD: 204 10^3/UL (ref 130–450)
RBC MAR: 4.29 10^6/UL (ref 4.2–5.4)

## 2018-12-15 PROCEDURE — 85025 COMPLETE CBC W/AUTO DIFF WBC: CPT

## 2018-12-15 PROCEDURE — 85027 COMPLETE CBC AUTOMATED: CPT

## 2018-12-15 PROCEDURE — 80076 HEPATIC FUNCTION PANEL: CPT

## 2018-12-15 PROCEDURE — 82239 BILE ACIDS TOTAL: CPT

## 2018-12-15 RX ADMIN — URSODIOL SCH MG: 250 TABLET, FILM COATED ORAL at 17:46

## 2018-12-15 RX ADMIN — SODIUM CHLORIDE, POTASSIUM CHLORIDE, SODIUM LACTATE AND CALCIUM CHLORIDE SCH: 600; 310; 30; 20 INJECTION, SOLUTION INTRAVENOUS at 18:18

## 2018-12-15 NOTE — PREOP HISTORY & PHYSICAL
DATE OF SERVICE: 12/15/2018

Physician: Anmol Alvarez MD

 

IDENTIFICATION:  Patient is a 28-year-old G1, P0, female whose EDC is 5 January,
this makes her 37.0 weeks.

 

CHIEF COMPLAINT:  Cholestasis pregnancy.

 

HISTORY OF PRESENT ILLNESS:  Patient initially had her OB care done at [place].  
However, she transferred to our clinic at 26.3 weeks.  She has developed 
cholestasis of pregnancy during her pregnancy.  She initially had a bile salts, 
which was in the 40s.  With medication this dropped down to 3.4.  She is 
currently taking ursodiol 600 mg p.o. t.i.d. with meals.  She states her itching
is markedly improved.  She presents today for induction of labor because of the 
risk of fetal loss with cholestasis of pregnancy.  She has been doing nonstress 
tests throughout.

 

PAST MEDICAL HISTORY:  Positive for gallstone pancreatitis.  She also has a 
history of endometriosis as well as some irritable bowel syndrome.

 

PAST SURGICAL HISTORY:  Laparoscopic cholecystectomy, tonsillectomy, 
adenoidectomy.

 

ALLERGIES:  NONE KNOWN.

 

CURRENT MEDICATIONS 

1.  Prenatal vitamins. 

2.  Ursocol 600 mg t.i.d. with meals.

 

HABITS:  Patient denies use of alcohol, tobacco, street drugs.

 

SOCIAL HISTORY:  Patient is  and lives with spouse.  Works as a teacher.

 

FAMILY HISTORY:  Positive for some diabetes, depression, as well as heart 
disease.

 

PHYSICAL EXAMINATION 

GENERAL:  Patient is a well-developed, well-nourished white female.  She is in 
no acute distress at this time.  She denies any itching.  

VITAL SIGNS:  Noted. 

HEENT:  Pupils are equal, round.  Extraocular muscles are intact.  Mouth is 
clear.  Thyroid is not palpably enlarged.

HEART:  Regular rate and rhythm without murmurs.

LUNGS:  Lung fields are clear without rales or wheezes.

BACK:  No spinal or CVA tenderness noted.

ABDOMEN:  Exam shows uterus, which is gravid, it is about 36 cm in size.  Vertex
presentation.

PELVIC:  Cervix, which was 2 cm, 75% effaced, and -2 vertex with a midposition 
cervix.  It was soft.  A Stewart bulb was placed without difficulty, 60 mL and the
distal and 40 mL in the proximal bulb.  The patient tolerated the procedure 
well.  Her DTRs are 2+.  There is no evidence of any clonus.

 

IMPRESSION  

1.  A 28-year-old G1, P0, 37 weeks EGA.

2.  Cholestasis of pregnancy.

 

PLAN:  After a long discussion, it was decided to proceed on with a Cook 
catheter.  This was placed without difficulty.  We will allow this to stay in 
overnight.  We will monitor fetal heart rates periodically through the night.  
When they Stewart bulb, will anticipate starting Pitocin in the morning.

 

 

DD: 12/15/2018 17:22

TD: 12/15/2018 17:30

Job #: [id number]

[email or web address]

## 2018-12-15 NOTE — HISTORY & PHYSICAL EXAMINATION
Prenatal Admit History





- Visit Reason


Visit Reason: Other (cervical ripening and induction for cholistasis of 

pregnancy)





- Pregnancy


: 1


Parity: 0


Prenatal Care: positive: [facility]


Prenatal Risk/History: positive: Other (cholistasis of pregnancy)


Pregnancy Complications This Pregnancy: positive: Other (cholistasis)


Smoking Status: Never smoker





- Mother's Labs


Mother's Blood Type: positive: B


Mother's RH: positive: Positive


GBS: positive: Group B Step Negative


Rubella Status: positive: Immune





Meds/Allgy





- Allergies


Allergies/Adverse Reactions: 


                                    Allergies











Allergy/AdvReac Type Severity Reaction Status Date / Time


 


No Known Drug Allergies Allergy   Verified 10/22/18 16:47














Physical





- Abdominal Exam


Vital Signs: 





                                        











Temp Pulse Resp BP Pulse Ox


 


 36.6 C   64   16   116/72   100 


 


 12/15/18 15:17  12/15/18 15:17  12/15/18 15:17  12/15/18 15:17  12/15/18 15:17











Contraction Intensity: positive: Mild


Uterine Resting Tone: positive: Soft





- Fetal Monitoring


Fetal Heart Rate Baseline: 130


Fetal Strip Review: positive: Category I





- Presentation


Presentation: positive: Vertex





- Vaginal Exam


Membranes: positive: Membranes intact


Dilation (in cm): 2


Effacement (%): 75%


Station: positive: -2


Cervical Position: positive: Midposition





- Other Notes


Labor Progress Note/Additional Text: 





27 yo  37 weeks


Cholistasis of pregnancy.


[facility]


lu in the AM.


URSICOL 500 TID WITH MEALS

## 2018-12-16 PROCEDURE — 3E033VJ INTRODUCTION OF OTHER HORMONE INTO PERIPHERAL VEIN, PERCUTANEOUS APPROACH: ICD-10-PCS | Performed by: OBSTETRICS & GYNECOLOGY

## 2018-12-16 PROCEDURE — 10907ZC DRAINAGE OF AMNIOTIC FLUID, THERAPEUTIC FROM PRODUCTS OF CONCEPTION, VIA NATURAL OR ARTIFICIAL OPENING: ICD-10-PCS | Performed by: OBSTETRICS & GYNECOLOGY

## 2018-12-16 RX ADMIN — IBUPROFEN SCH MG: 600 TABLET, FILM COATED ORAL at 23:27

## 2018-12-16 RX ADMIN — URSODIOL SCH MG: 250 TABLET, FILM COATED ORAL at 08:34

## 2018-12-16 RX ADMIN — SODIUM CHLORIDE, POTASSIUM CHLORIDE, SODIUM LACTATE AND CALCIUM CHLORIDE SCH MLS/HR: 600; 310; 30; 20 INJECTION, SOLUTION INTRAVENOUS at 08:35

## 2018-12-16 RX ADMIN — DOCUSATE SODIUM SCH MG: 100 CAPSULE, LIQUID FILLED ORAL at 23:27

## 2018-12-16 RX ADMIN — URSODIOL SCH: 250 TABLET, FILM COATED ORAL at 19:56

## 2018-12-16 RX ADMIN — SODIUM CHLORIDE, POTASSIUM CHLORIDE, SODIUM LACTATE AND CALCIUM CHLORIDE SCH MLS/HR: 600; 310; 30; 20 INJECTION, SOLUTION INTRAVENOUS at 13:24

## 2018-12-16 RX ADMIN — URSODIOL SCH: 250 TABLET, FILM COATED ORAL at 14:47

## 2018-12-16 NOTE — PROVIDER PROGRESS NOTE
Labor Progress Note





- Uterine Monitoring


: 0


Uterine Resting Tone: positive: Soft





- Fetal Monitoring


Fetal Monitor Mode: positive: External ultrasound


Fetal Heart Rate Variability: positive: Moderate (6-25 bmp)


Fetal Accelerations: positive: Present, 15x15


Fetal Decelerations: positive: None


Fetal Strip Review: positive: Category I





- Vaginal Exam


Dilation (in cm): 5


Effacement (%): 90


Station: -1


Cervical Position: Posterior





- Labor Progress Note


Labor Progress Note/Additional Text: 





Start pitocin.

## 2018-12-16 NOTE — PROVIDER PROGRESS NOTE
Labor Progress Note





- Uterine Monitoring


Contraction Frequency (min/apart): 6-7


Contraction Intensity: positive: Moderate


Uterine Resting Tone: positive: Soft





- Fetal Monitoring


Fetal Monitor Mode: positive: External ultrasound


Fetal Heart Rate Baseline: 135


Fetal Heart Rate Variability: positive: Moderate (6-25 bmp)


Fetal Accelerations: positive: Present, 15x15


Fetal Decelerations: positive: None


Fetal Strip Review: positive: Category I





- Vaginal Exam


Dilation (in cm): 7


Effacement (%): 100


Station: -1


Cervical Position: Posterior





- Labor Progress Note


Labor Progress Note/Additional Text: 





progressing well dispite moderate contraction.


AROM clear fluid.


Pt desires Epidural.

## 2018-12-16 NOTE — ANESTHESIA
Pre-Anesthesia VS, & Labs





- Diagnosis





pregnancy in active labor, patient desires labor analgesia





- Procedure





Labor epidural


Vital Signs: 





                                        











Temp Pulse Resp BP Pulse Ox


 


 36.8 C   70   18   110/70   100 


 


 18 06:24  18 06:24  18 06:24  18 06:24  18 06:24














                                        





Height                           5 ft 7 in


Weight (kg)                      78.471 kg











- NPO


Other (instructed to be clears from now until delivery)





- Pregnancy


Is Patient Pregnant?: Yes





- Lab Results


Current Lab Results: 





Laboratory Tests





12/15/18 17:15: WBC 13.0 H, RBC 4.29, Hgb 13.0, Hct 37.2, MCV 86.8, MCH 30.2, 

MCHC 34.8, RDW 13.0, Plt Count 204, MPV 8.5, Neut # (Auto) 9.9 H, Lymph # (Auto)

1.9, Mono # (Auto) 0.9, Eos # (Auto) 0.3, Baso # (Auto) 0.1, Absolute Nucleated 

RBC 0.00, Nucleated RBC % 0.0








Fish Bones: 


                                 12/15/18 17:15








Home Medications and Allergies





Active Medications





Acetaminophen (Tylenol)  650 mg PO Q6H PRN


   PRN Reason: Pain or Fever


Calcium Carbonate/Glycine (Tums)  500 mg PO TID PRN


   PRN Reason: Heartburn


   Last Admin: 12/15/18 20:06 Dose:  500 mg


Fentanyl (Fentanyl)  50 mcg IVP Q1H PRN


   PRN Reason: PAIN


Lactated Ringer's (Lr)  1,000 mls @ 100 mls/hr IV .Q10H EDIN


   Last Admin: 18 13:24 Dose:  100 mls/hr


Oxytocin/Sodium Chloride (Pitocin/Sodium Chloride)  500 mls @ 1 mls/hr IV TITR 

EDIN; Protocol


   Last Admin: 18 08:35 Dose:  1 milliunit/min, 1 mls/hr


Ondansetron HCl (Zofran Inj)  4 mg IVP Q4HR PRN


   PRN Reason: Nausea / Vomiting


Ranitidine HCl (Zantac)  150 mg PO DAILY EDIN


   Last Admin: 18 08:34 Dose:  150 mg


Sodium Chloride (Normal Saline Flush 0.9%)  10 ml IVP 0100,0900,1700 [facility]


   Last Admin: 18 08:34 Dose:  10 ml


Sodium Chloride (Normal Saline Flush 0.9%)  10 ml IVP PRN PRN


   PRN Reason: AS NEEDED PER PROVIDER ORDERS


Ursodiol (Rosalio 250)  500 mg PO TIDWM [facility]


   Last Admin: 18 08:34 Dose:  500 mg








Allergies/Adverse Reactions: 


                                    Allergies











Allergy/AdvReac Type Severity Reaction Status Date / Time


 


No Known Drug Allergies Allergy   Verified 10/22/18 16:47














Anes History & Medical History





- Anesthetic History


Anesthesia Complications: reports: No previous complications





- Medical History


Pulmonary: reports: None


Gastrointestinal: reports: GERD, Pancreatitis, Cholelithiasis


Neuro: reports: None


Musculoskeletal: reports: None


Endocrine/Autoimmune: reports: Other (mild gestational DM per patient)


Smoking Status: Never smoker





- Surgical History


General: Cholecystectomy





- Obstetrical History


: 1


Parity: 0


Prenatal Events: positive: Other (cholistasis of pregnancy)


Pregnancy Complications: positive: Other (cholistasis)





Exam


General: Alert


Mouth Opening: Greater than 4 Fingerbreadths


Mallampati classification: II


Thyromental Distance: greater than 6 cm


Cardiovascular: Regular rate


Mental/Cognitive Status: Alert/Oriented X3





Plan


Anesthesia Type: Epidural


Consent for Procedure(s) Verified and Reviewed: Yes


Code Status: Attempt Resuscitation


ASA classification: 2-Mild systemic disease


Is this case an emergency?: No

## 2018-12-16 NOTE — PROVIDER PROGRESS NOTE
Labor Progress Note





- Uterine Monitoring


Contraction Frequency (min/apart): 2-3


Contraction Intensity: positive: Moderate to strong


Uterine Resting Tone: positive: Soft





- Fetal Monitoring


Fetal Monitor Mode: positive: External ultrasound


Fetal Heart Rate Baseline: 130


Fetal Heart Rate Variability: positive: Moderate (6-25 bmp)


Fetal Accelerations: positive: Present, 15x15


Fetal Decelerations: positive: Early (heads)


Fetal Strip Review: positive: Category I





- Vaginal Exam


Dilation (in cm): 8


Effacement (%): 100%


Station: 0


Cervical Position: Midposition





- Labor Progress Note


Labor Progress Note/Additional Text: 





Progressing.





continue Pit

## 2018-12-16 NOTE — DELIVERY NOTE
Delivery Note





- Labor


Labor: positive: Induced by oxytocin (pit started at 0840)





- Infant Delivery Method


Infant Delivery Method: positive: Spontaneous vaginal delivery





- Cervical Ripening Method


Cervical Ripening Method: positive: Balloon device (Placed at 1647 12/15/18. 

came out at 1930.  alowed to rest overnight.)





- Birth Presentation


Birth Presentation: positive: Vertex, Compound (left hand), RONY - right occiput 

anterior





- Nuchal Cord


Nuchal Cord: positive: None





- Anesthetic


Anesthetic Type: 





- Amniotic Fluid Description


Amniotic Fluid Description: positive: Clear (AROM 1247)





- Episiotomy Type


Episiotomy Type: positive: None





- Laceration


Laceration: positive: None





- Delivery Outcome


Delivery Outcome: positive: Livebirth





- 


: positive: Placed in direct skin contact with mother, Suctioned, 

Stimulated


 sex: positive: Female (Apgars 9/10)





- Cord


Cord: positive: 3 vessels





- Estimated Blood Loss


Estimated Blood Loss (in cc): 200





- Post Delivery Events


Post Delivery Events: positive: No post delivery events





- Delivery Comments (Free Text/Narrative)


Delivery Comments (Free Text/Narrative): 





pt reached complete at 1721 allowed to labor down started pushing at about 1745 

delivered at 1753, Live female infant RONY compound left hand.  No episiotomy or 

lacerations.  cord allowed to stop pulsating then clamped and cut by .  

Placenta followed in tact at 1806.

## 2018-12-17 LAB
ALBUMIN DIAFP-MCNC: 3 G/DL (ref 3.2–5.5)
ALP SERPL-CCNC: 126 IU/L (ref 42–121)
ALT SERPL W P-5'-P-CCNC: 15 IU/L (ref 10–60)
AST SERPL W P-5'-P-CCNC: 31 IU/L (ref 10–42)
BASOPHILS NFR BLD AUTO: 0 10^3/UL (ref 0–0.1)
BASOPHILS NFR BLD AUTO: 0.2 %
BILIRUB BLD-MCNC: 0.5 MG/DL (ref 0.2–1)
BILIRUB DIRECT SERPL-MCNC: 0.1 MG/DL (ref 0.1–0.5)
EOSINOPHIL # BLD AUTO: 0.2 10^3/UL (ref 0–0.7)
EOSINOPHIL NFR BLD AUTO: 1.4 %
ERYTHROCYTE [DISTWIDTH] IN BLOOD BY AUTOMATED COUNT: 13 % (ref 12–15)
GLOBULIN SER-MCNC: 3.5 G/DL (ref 2.1–4.2)
HGB UR QL STRIP: 13.3 G/DL (ref 12–16)
LYMPHOCYTES # SPEC AUTO: 2.1 10^3/UL (ref 1.5–3.5)
LYMPHOCYTES NFR BLD AUTO: 14.1 %
MCH RBC QN AUTO: 30.3 PG (ref 27–31)
MCHC RBC AUTO-ENTMCNC: 34.1 G/DL (ref 32–36)
MCV RBC AUTO: 88.8 FL (ref 81–99)
MONOCYTES # BLD AUTO: 0.8 10^3/UL (ref 0–1)
MONOCYTES NFR BLD AUTO: 5.7 %
NEUTROPHILS # BLD AUTO: 11.6 10^3/UL (ref 1.5–6.6)
NEUTROPHILS # SNV AUTO: 14.7 X10^3/UL (ref 4.8–10.8)
NEUTROPHILS NFR BLD AUTO: 78.6 %
PDW BLD AUTO: 8.6 FL (ref 7.9–10.8)
PLATELET # BLD: 202 10^3/UL (ref 130–450)
PROT SPEC-MCNC: 6.5 G/DL (ref 6.7–8.2)
RBC MAR: 4.4 10^6/UL (ref 4.2–5.4)

## 2018-12-17 RX ADMIN — IBUPROFEN SCH: 600 TABLET, FILM COATED ORAL at 22:12

## 2018-12-17 RX ADMIN — SIMETHICONE SCH: 80 TABLET, CHEWABLE ORAL at 13:43

## 2018-12-17 RX ADMIN — IBUPROFEN SCH MG: 600 TABLET, FILM COATED ORAL at 15:43

## 2018-12-17 RX ADMIN — IBUPROFEN SCH: 600 TABLET, FILM COATED ORAL at 13:42

## 2018-12-17 RX ADMIN — SIMETHICONE SCH: 80 TABLET, CHEWABLE ORAL at 17:03

## 2018-12-17 RX ADMIN — DOCUSATE SODIUM SCH MG: 100 CAPSULE, LIQUID FILLED ORAL at 08:48

## 2018-12-17 RX ADMIN — SIMETHICONE SCH MG: 80 TABLET, CHEWABLE ORAL at 08:49

## 2018-12-17 RX ADMIN — IBUPROFEN SCH MG: 600 TABLET, FILM COATED ORAL at 05:14

## 2018-12-17 RX ADMIN — DOCUSATE SODIUM SCH MG: 100 CAPSULE, LIQUID FILLED ORAL at 21:09

## 2018-12-17 NOTE — PROVIDER PROGRESS NOTE
Subjective





- Prog Note Date


Prog Note Date: 18


Prog Note Time: 08:27





- Subjective


Pt reports feeling: Improved (Pain scale is 2/10. No itching. brest feeding.)





Objective





- Vital Signs/Intake & Output


Reviewed Vital Signs: Yes


Vital Signs: 


                                Vital Signs x48h











  Temp Pulse Resp BP Pulse Ox


 


 18 05:15  36.4 C L  65  18  109/69 


 


 18 02:00  36.8 C  68  18  104/65  99











Intake & Output: 


                                 Intake & Output











 12/14/18 12/15/18 12/16/18 12/17/18





 23:59 23:59 23:59 23:59


 


Intake Total   3731.667 


 


Output Total   1900 


 


Balance   1831.667 














- Objective


General Appearance: positive: No acute distress, Alert


Respiratory: positive: Chest non-tender, No respiratory distress, Breath sounds 

nml


Cardiovascular: positive: Regular rate & rhythm, No murmur, No gallop


Abdomen: positive: Non-tender, Nml bowel sounds, Mass (u-3)


Back: negative: CVA tenderness (R), CVA tenderness (L)


Extremities: negative: Calf tenderness, Jarrell's sign/cords


Neurologic/Psychiatric: positive: Oriented x3





- Lab Results


Fish Bones: 


                                 18 06:05





Other Labs: 


                               Lab Results x24hrs











  18 Range/Units





  06:05 06:05 


 


WBC  14.7 H   (4.8-10.8)  x10^3/uL


 


RBC  4.40   (4.20-5.40)  10^6/uL


 


Hgb  13.3   (12.0-16.0)  g/dL


 


Hct  39.1   (37.0-47.0)  %


 


MCV  88.8   (81.0-99.0)  fL


 


MCH  30.3   (27.0-31.0)  pg


 


MCHC  34.1   (32.0-36.0)  g/dL


 


RDW  13.0   (12.0-15.0)  %


 


Plt Count  202   (130-450)  10^3/uL


 


MPV  8.6   (7.9-10.8)  fL


 


Neut # (Auto)  11.6 H   (1.5-6.6)  10^3/uL


 


Lymph # (Auto)  2.1   (1.5-3.5)  10^3/uL


 


Mono # (Auto)  0.8   (0.0-1.0)  10^3/uL


 


Eos # (Auto)  0.2   (0.0-0.7)  10^3/uL


 


Baso # (Auto)  0.0   (0.0-0.1)  10^3/uL


 


Absolute Nucleated RBC  0.01   x10^3/uL


 


Nucleated RBC %  0.0   /100WBC


 


Total Bilirubin   0.5  (0.2-1.0)  mg/dL


 


Direct Bilirubin   0.1  (0.1-0.5)  mg/dL


 


AST   31  (10-42)  IU/L


 


ALT   15  (10-60)  IU/L


 


Alkaline Phosphatase   126 H  ()  IU/L


 


Total Protein   6.5 L  (6.7-8.2)  g/dL


 


Albumin   3.0 L  (3.2-5.5)  g/dL


 


Globulin   3.5  (2.1-4.2)  g/dL














Assessment/Plan





- Problem List


(1)  (spontaneous vaginal delivery)


Impression: 


Pt is recovering well.


breast feeding with out difficulty








(2) Cholestasis during pregnancy


Impression: 


Pt was induced at 37 weeks and is without any liver changes.  itching resolved.


Qualifiers: 


   Trimester: unspecified trimester   Qualified Code(s): O26.619 - Liver and 

biliary tract disorders in pregnancy, unspecified trimester; K83.1 - Obstruction

of bile duct

## 2018-12-18 VITALS — SYSTOLIC BLOOD PRESSURE: 121 MMHG | DIASTOLIC BLOOD PRESSURE: 78 MMHG

## 2018-12-18 RX ADMIN — IBUPROFEN SCH: 600 TABLET, FILM COATED ORAL at 04:00

## 2018-12-18 RX ADMIN — DOCUSATE SODIUM SCH MG: 100 CAPSULE, LIQUID FILLED ORAL at 09:00

## 2018-12-18 RX ADMIN — IBUPROFEN SCH: 600 TABLET, FILM COATED ORAL at 09:00

## 2018-12-18 NOTE — LABOR FLOWSHEET
===================================

Labor Flowsheet

===================================

Datetime Report Generated by CPN: 12/18/2018 11:30

   

   

===========================

Datetime: 12/18/2018 08:56

===========================

   

   

===================================

VITAL SIGNS

===================================

   

 NBP Sys/Brittanie/Mean (mmHg):  121

:  78

:  87

 Pulse:  87

   

===========================

Datetime: 12/17/2018 15:29

===========================

   

 SpO2 (%):  100

   

===========================

Datetime: 12/16/2018 18:28

===========================

   

 Membranes Ruptured Date/Time:  12/16/2018 12:47

   

===========================

Datetime: 12/16/2018 17:54

===========================

   

Stage of Pregnancy:  Postpartum

   

===========================

Datetime: 12/16/2018 17:53

===========================

   

 Comments:  Delivery of baby girl over an intact perineum by Dr Alvarez.  Apgars 9/10.  Baby up on mom's
 tummy for bonding.  Happy birthday, Baby Melba!!

 Preparation for Delivery:  Perineal Clip

   

===========================

Datetime: 12/16/2018 17:50

===========================

   

 LaborFlag:  Labor

   

===========================

Datetime: 12/16/2018 17:35

===========================

   

 Provider Reviewed Strip:  Yes

 Pushing Progress:  Descent with Pushing

   

===================================

COMMUNICATION

===================================

   

 Communication:  Provider at Bedside

 Notification Reason:  Status Update

 Communication Comments:  Dr Alvarez here for delivery

   

===========================

Datetime: 12/16/2018 17:21

===========================

   

   

===================================

VAGINAL EXAM

===================================

   

 Dilatation (cm):  10.0

 Effacement (%):  100

 Station:  2

 Exam by:  SIN Mitchell [facility]

 Vaginal Bleeding:  Normal Show

 Cervix, Consistency:  Soft

 Cervix, Position:  Anterior

   

===========================

Datetime: 12/16/2018 17:19

===========================

   

 Monitor Interventions for FHR:  Ultrasound Adjusted

 FHR Baseline Changes:  No Baseline Change

 Accelerations:  10X10

   

===================================

PAIN

===================================

   

 Pain Scale:  2

 Pain Presence:  Intermittent

 Patient Care Comments:  vomited. 

   

===========================

Datetime: 12/16/2018 17:18

===========================

   

   

===================================

FETAL ASSESSMENT A

===================================

   

 Monitor Mode:  External US

 FHR Baseline Rate :  150

 Variability:  Moderate 6-25 bpm

 Decelerations:  Variable

 Category:  Category II

 Oxygen Amount (LPM):  10

 Oxygen Method:  Face Mask

 Patient Position/Activity:  Left Tilt

   

===================================

STAGE 2

===================================

   

 Pushing:  Coached on Pushing

 Pushing Position:  Laboring Down

 Stage 2 Comments:  Dr Giem called to come

   

===========================

Datetime: 12/16/2018 17:16

===========================

   

 Membrane Status:  Ruptured

 Amniotic Fluid Color:  Clear

   

===========================

Datetime: 12/16/2018 17:00

===========================

   

   

===================================

MEDICATIONS

===================================

   

 Pitocin (milliunits):  Increased to @

   

===========================

Datetime: 12/16/2018 16:59

===========================

   

 Temperature (C):  36.7

 Hygiene:  [facility]

 Anesthesia Level Check:  T10- Umbilicus

   

===========================

Datetime: 12/16/2018 16:45

===========================

   

 Pain Location:  Abdomen

   

===================================

MATERNAL ASSESSMENT

===================================

   

 Level of Consciousness:  Fully Conscious

   

===========================

Datetime: 12/16/2018 16:29

===========================

   

 Vaginal Exam Comments:  tight ant lip

 Headache:  Denies

 Procedures:  Sterile Vag Exam

 I/O Interventions:  Bedpan Given; Straight Cath (ml) @ 600

   

===================================

ANESTHESIA

===================================

   

 Anesthesia Plans:  Spinal

 Plan of Care:  Plan of Care Discussed

   

===========================

Datetime: 12/16/2018 16:00

===========================

   

 Strip Reviewed by:  Giem

   

===========================

Datetime: 12/16/2018 14:54

===========================

   

 Antiemetics/Antacids:  Zofran (mg) @ 4

   

===========================

Datetime: 12/16/2018 14:29

===========================

   

 Pitocin Checklist:  At Least 1 Acceleration of 15 bpm x 15 Seconds in 30 Minutes or Adequate Variabi
lity; No More than 2 Variable Decelerations > 60 Seconds in Duration and decreasing >60 bpm in 30 min
utes; No More than 5 Uterine Contractions in 10 Minutes for any 20 Minute Interval; Uterus Palpates S
oft between Contractions

 Pain Management:  Epidural

   

===========================

Datetime: 12/16/2018 13:59

===========================

   

 Monitor Interventions for UA:  [place] Adjusted

 Provider Notified (Name):  Edward Aube CRNa here

   

===========================

Datetime: 12/16/2018 13:54

===========================

   

 Epidural Procedure Other:  Pump Started

   

===========================

Datetime: 12/16/2018 13:33

===========================

   

   

===================================

PROCEDURE TIME OUT

===================================

   

 Procedure Verify:  Correct Patient Identity; Correct Side and Site are Marked; Accurate Procedure Co
nsent Form; Agreement on Procedure to be Done; Correct Patient Position; Relevant Images and Results 
are Properly Labeled and Displayed; Addressed Need to Administer Antibiotics or Fluids for Irrigation
; Safety Precautions Based on Patient History or Medication Use

 Epidural Positioning:  Side Lying

   

===========================

Datetime: 12/16/2018 13:30

===========================

   

 Epidural Procedure:  Cath Placed; Test Dose; Completed

   

===========================

Datetime: 12/16/2018 13:15

===========================

   

 Comfort Measures:  Breathing/Relaxation; Anesthesia Notified

   

===========================

Datetime: 12/16/2018 13:00

===========================

   

   

===================================

TEACHING

===================================

   

 Instructional Method:  Verbalized Understanding

   

===========================

Datetime: 12/16/2018 12:47

===========================

   

 Pain Type:  Cramping

 Membranes Rupture Method:  Artificial

 Amniotic Fluid Odor:  Normal

 Membrane Comments:  AROM By Dr Kim DOS SANTOS Epigastric Pain:  Denies

   

===========================

Datetime: 12/16/2018 12:30

===========================

   

 Amniotic Fluid Amount:  None

   

===========================

Datetime: 12/16/2018 11:30

===========================

   

 Pain Coping:  Talking Through Contractions

 DTR's/Clonus:  DTRs Absent

 Breath Sounds, Left:  Clear and Equal

 Breath Sounds, Right:  Clear and Equal

 Labor/Induction:  Labor Stages

   

===========================

Datetime: 12/16/2018 11:15

===========================

   

   

===================================

UTERINE ACTIVITY

===================================

   

 Monitor Mode:  External

 Frequency (min):  7

 Quality:  Mild

 Duration (sec):  70

 Pattern:  Normal: <= 5 Contractions in 10 Minutes

 Resting Tone (Palpate):  Relaxed

   

===========================

Datetime: 12/16/2018 10:15

===========================

   

 Contraction Comments:  no ctx.  [place] adj - mom's tummy pressing on pillow

   

===========================

Datetime: 12/16/2018 08:40

===========================

   

 Fetal Lie 'A':  Longitudinal

 Nausea/Vomiting:  Denies

   

===================================

PATIENT CARE

===================================

   

 IV/Blood Work:  IV Started

 Unit Routine:  [place] to Room; Visiting Policy; Waiting Areas; Phone/Cell Phone Use; Photography; Ha
ndwashing; Fetal Monitoring; IV Pumps; Safety/Fall Risk Prevention

 Medications:  Pitocin

 Pregnancy Related:  Common Discomforts of Pregnancy; Nutrition; Hydration; Activity and Rest

## 2018-12-18 NOTE — DISCHARGE PLAN
Discharge Plan


Disposition: 01 Home, Self Care


Condition: Good


Diet: Regular


Activity Restrictions: pelvic rest 6 weeks


Shower Restrictions: No


Driving Restrictions: No


No Smoking: If you smoke, Please STOP!  Call [phone] for help.

## 2018-12-18 NOTE — PROVIDER PROGRESS NOTE
Subjective





- Prog Note Date


Prog Note Date: 18


Prog Note Time: 08:18





- Subjective


Pt reports feeling: Improved (breast feeding, pain 3/10, good pain control.  

baby possible increased bili. enrrique itching)





Objective





- Vital Signs/Intake & Output


Reviewed Vital Signs: Yes


Vital Signs: 


                                Vital Signs x48h











  Temp Pulse Resp BP Pulse Ox


 


 18 05:20  36.6 C  63  20  121/76  98











Intake & Output: 


                                 Intake & Output











 12/15/18 12/16/18 12/17/18 12/18/18





 23:59 23:59 23:59 23:59


 


Intake Total  3731.667  


 


Output Total  1900  


 


Balance  1831.667  














- Objective


General Appearance: positive: No acute distress





- Lab Results


Fish Bones: 


                                 18 06:05








Assessment/Plan





- Problem List


(1)  (spontaneous vaginal delivery)


Impression: 


excellent progress








(2) Cholestasis during pregnancy


Impression: 


symptoms resolved


Qualifiers: 


   Trimester: unspecified trimester   Qualified Code(s): O26.619 - Liver and 

biliary tract disorders in pregnancy, unspecified trimester; K83.1 - Obstruction

of bile duct

## 2018-12-28 NOTE — DISCHARGE SUMMARY
Physician: Anmol Alvarez MD

DATE OF ADMISSION: 12/15/2018

DATE OF DISCHARGE:  12/18/2018

 

ADMITTING DIAGNOSES  

1.  A 28-year-old G1, P0, 37 weeks.

2.  Cholestasis of pregnancy.

 

DISCHARGE DIAGNOSES  

1.  A 28-year-old G1, P0, 37 weeks.

2.  Cholestasis of pregnancy.

3.  Spontaneous vaginal delivery.

 

PROCEDURES  

1.  Cervical ripening with a Cook catheter.

2.  Pitocin augmentation.

3.  Epidural.

4.  Spontaneous vaginal delivery.

 

PRESENTING HISTORY:  The patient is a 28-year-old G1, P0, female whose due date was 01/05/2019.  She 
was 37 weeks on admission.  She has a history of having cholestasis of pregnancy, which was diagnosed
 early in her pregnancy.  She initially had bile salts elevated in the 40s.  She was placed on Ursoco
l 600 mg t.i.d. and dropped these values down to 3.4.  Her itching markedly improved.  She presented 
for induction because of increased risk of fetal loss with cholestasis of pregnancy. 

 

LABORATORY DATA:  CBC on admission showed a white count of 13.0, her hemoglobin was 13.0, hematocrit 
was 37.2, platelets were 204.  On the first day postpartum, her count was 14.7, hemoglobin 13.3, plat
elets were 202.  She had LFTs performed, which showed a alkaline phosphatase of 126, total protein an
d albumin were low depressed at 6.5 and 3.0.  Total bile acids on admission was 16.

 

HOSPITAL COURSE:  The patient admitted, a Cook catheter was placed intracervical for cervical ripenin
g.  She rapidly progressed to 5 cm.  She was allowed to rest the night.  The following morning, she w
as placed on Pitocin.  She progressed to complete and following a very short second stage spontaneous
ly delivered a live female infant, Apgars 9 and 10.  Her perineum was intact without evidence of any 
lacerations.  Estimated blood loss was 200 mL.  Both mother and infant tolerated delivery well.  Ther
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  Her postpartum course was unremarkable.  Her diet was placed at regular.  She
 was discharged to home on the 18th with instructions to followup in the clinic.

 

DISCHARGE MEDICATIONS:   Were those of Motrin as well as Tylenol. 

 

DISCHARGE INSTRUCTIONS:  She was instructed regarding contraception and to watch for signs and sympto
ms of mastitis as well as infection.

 

 

DD: 12/28/2018 11:49

TD: 12/28/2018 11:57

Job #: [id number]

## 2019-01-29 ENCOUNTER — HOSPITAL ENCOUNTER (OUTPATIENT)
Dept: HOSPITAL 76 - LAB | Age: 29
Discharge: HOME | End: 2019-01-29
Attending: OBSTETRICS & GYNECOLOGY
Payer: COMMERCIAL

## 2019-01-29 DIAGNOSIS — O26.613: Primary | ICD-10-CM

## 2019-01-29 PROCEDURE — 36415 COLL VENOUS BLD VENIPUNCTURE: CPT

## 2019-01-29 PROCEDURE — 82239 BILE ACIDS TOTAL: CPT
